# Patient Record
Sex: FEMALE | Race: WHITE | Employment: FULL TIME | URBAN - METROPOLITAN AREA
[De-identification: names, ages, dates, MRNs, and addresses within clinical notes are randomized per-mention and may not be internally consistent; named-entity substitution may affect disease eponyms.]

---

## 2018-04-20 ENCOUNTER — HOSPITAL ENCOUNTER (OUTPATIENT)
Dept: ULTRASOUND IMAGING | Facility: HOSPITAL | Age: 28
Discharge: HOME | End: 2018-04-20
Attending: INTERNAL MEDICINE
Payer: COMMERCIAL

## 2018-04-20 DIAGNOSIS — R10.2 SUPRAPUBIC PAIN: ICD-10-CM

## 2018-04-20 PROCEDURE — 76856 US EXAM PELVIC COMPLETE: CPT | Performed by: INTERNAL MEDICINE

## 2018-04-20 PROCEDURE — 76830 TRANSVAGINAL US NON-OB: CPT | Performed by: INTERNAL MEDICINE

## 2018-07-03 ENCOUNTER — OFFICE VISIT (OUTPATIENT)
Dept: OTOLARYNGOLOGY | Facility: CLINIC | Age: 28
End: 2018-07-03

## 2018-07-03 ENCOUNTER — OFFICE VISIT (OUTPATIENT)
Dept: AUDIOLOGY | Facility: CLINIC | Age: 28
End: 2018-07-03

## 2018-07-03 VITALS
DIASTOLIC BLOOD PRESSURE: 70 MMHG | TEMPERATURE: 98 F | HEIGHT: 68 IN | WEIGHT: 143 LBS | SYSTOLIC BLOOD PRESSURE: 105 MMHG | BODY MASS INDEX: 21.67 KG/M2

## 2018-07-03 DIAGNOSIS — H90.12 CONDUCTIVE HEARING LOSS OF LEFT EAR WITH UNRESTRICTED HEARING OF RIGHT EAR: Primary | ICD-10-CM

## 2018-07-03 DIAGNOSIS — H71.92 CHOLESTEATOMA OF LEFT EAR: ICD-10-CM

## 2018-07-03 DIAGNOSIS — H91.92 HEARING LOSS OF LEFT EAR, UNSPECIFIED HEARING LOSS TYPE: Primary | ICD-10-CM

## 2018-07-03 PROCEDURE — 99212 OFFICE O/P EST SF 10 MIN: CPT | Performed by: OTOLARYNGOLOGY

## 2018-07-03 PROCEDURE — 92557 COMPREHENSIVE HEARING TEST: CPT | Performed by: AUDIOLOGIST

## 2018-07-03 PROCEDURE — 99243 OFF/OP CNSLTJ NEW/EST LOW 30: CPT | Performed by: OTOLARYNGOLOGY

## 2018-07-03 PROCEDURE — 92567 TYMPANOMETRY: CPT | Performed by: AUDIOLOGIST

## 2018-07-03 PROCEDURE — 92504 EAR MICROSCOPY EXAMINATION: CPT | Performed by: OTOLARYNGOLOGY

## 2018-07-03 NOTE — PROGRESS NOTES
Jorje Zamarripa is a 28year old female. Patient presents with:  Hearing Loss: left ear for at least 2 years     HPI:   She has been experiencing recurrent episodes of infections in her left ear for many years. She has been about once or twice a year. Normal Inspection - Right: Normal, Left: Normal. Canal - Left: Normal. TM - Right: Normal, Left: Normal.  [place] attic retraction without perforation. Left ear shows attic perforation with squamous debris which was cleared using suction and microscopy.

## 2018-07-03 NOTE — PROGRESS NOTES
AUDIOGRAM     Danna Mccracken was referred for testing by Ezequiel Prater V for left sided hearing loss   10/31/1990  [id number]      Otoscopic inspection: right ear no cerumen; left ear no cerumen.        Tests/Procedures  Patient was tested via  standard

## 2018-07-05 ENCOUNTER — TELEPHONE (OUTPATIENT)
Dept: OTOLARYNGOLOGY | Facility: CLINIC | Age: 28
End: 2018-07-05

## 2018-07-05 NOTE — TELEPHONE ENCOUNTER
Called pt's insurance [phone], spoke with [facility], a prior authorization is not required for CT temporal bones, reference number [id number]. Unable to leave a message for the pt the mailbox is not set up yet.

## 2018-07-09 NOTE — TELEPHONE ENCOUNTER
Spoke with pt and informed of above information and it is the patient's responsibility to verify benefits and out of pocket expenses for this service, pt verbalized understanding.

## 2020-03-02 ENCOUNTER — TELEPHONE (OUTPATIENT)
Dept: SCHEDULING | Age: 30
End: 2020-03-02

## 2020-03-03 ENCOUNTER — TELEPHONE (OUTPATIENT)
Dept: SURGERY | Age: 30
End: 2020-03-03

## 2020-05-12 ENCOUNTER — TELEPHONE (OUTPATIENT)
Dept: SURGERY | Age: 30
End: 2020-05-12

## 2020-05-13 ENCOUNTER — TELEPHONE (OUTPATIENT)
Dept: SCHEDULING | Age: 30
End: 2020-05-13

## 2020-05-13 ENCOUNTER — TELEPHONE (OUTPATIENT)
Dept: OTOLARYNGOLOGY | Age: 30
End: 2020-05-13

## 2020-05-15 ENCOUNTER — APPOINTMENT (OUTPATIENT)
Dept: OTOLARYNGOLOGY | Age: 30
End: 2020-05-15

## 2025-01-09 ENCOUNTER — APPOINTMENT (OUTPATIENT)
Dept: URBAN - METROPOLITAN AREA CLINIC 244 | Age: 35
Setting detail: DERMATOLOGY
End: 2025-01-09

## 2025-01-09 DIAGNOSIS — Z12.83 ENCOUNTER FOR SCREENING FOR MALIGNANT NEOPLASM OF SKIN: ICD-10-CM

## 2025-01-09 DIAGNOSIS — D485 NEOPLASM OF UNCERTAIN BEHAVIOR OF SKIN: ICD-10-CM

## 2025-01-09 PROBLEM — D48.5 NEOPLASM OF UNCERTAIN BEHAVIOR OF SKIN: Status: ACTIVE | Noted: 2025-01-09

## 2025-01-09 PROCEDURE — OTHER ORDER ULTRASOUND: OTHER

## 2025-01-09 PROCEDURE — OTHER COUNSELING: OTHER

## 2025-01-09 PROCEDURE — OTHER DIAGNOSIS COMMENT: OTHER

## 2025-01-09 PROCEDURE — 99202 OFFICE O/P NEW SF 15 MIN: CPT

## 2025-01-09 ASSESSMENT — LOCATION ZONE DERM: LOCATION ZONE: TRUNK

## 2025-01-09 ASSESSMENT — LOCATION DETAILED DESCRIPTION DERM
LOCATION DETAILED: INFERIOR THORACIC SPINE
LOCATION DETAILED: SUPERIOR LUMBAR SPINE

## 2025-01-09 ASSESSMENT — LOCATION SIMPLE DESCRIPTION DERM
LOCATION SIMPLE: LOWER BACK
LOCATION SIMPLE: UPPER BACK

## 2025-01-09 NOTE — PROCEDURE: DIAGNOSIS COMMENT
Render Risk Assessment In Note?: no
Detail Level: Simple
Comment: Recommend having imaging.... ultra sound  ordered for pt but ultimately I defer to an ortho's rec as I feel this is not a dermatological problem amenable to dermatological intervention. I discussed I could not confirm the diagnosis today and that I feel an orthopedic doctor is the best next step for evaluation and I feel is necessary and important.\\nif it is changing/growing then prompt evaluation is particularly important. 
Comment: Although a limited exam was performed today (instead of a full exam per pt preference), I strongly encourage full-body skin exams. If limited exams are preferred, good self-examinations of all areas of the body are required (handout provided with information on how to perform self skin checks and what to look for as it relates to concerning lesions)

## 2025-01-09 NOTE — HPI: EVALUATION OF SKIN LESION(S)
How Severe Are Your Spot(S)?: mild
Have Your Spot(S) Been Treated In The Past?: has not been treated
Hpi Title: Evaluation of a Skin Lesion
Additional History: Pt tried to pop lesion

## 2025-01-09 NOTE — PROCEDURE: ORDER ULTRASOUND
Provider: Chacorta Szymanski MD
Priority: normal
Subcutaneous Lesion Ultrasound Reason: Sq nodule of unknown etiology. ? Lipoma?
Lesion Location: left lower back
Detail Level: Simple
Ultrasound Protocol: Ultrasound of Subcutaneous Mass

## (undated) NOTE — LETTER
Seth Perez [address]       07/03/18        Patient: Franck Elizabeth   YOB: 1990   Date of Visit: 7/3/2018       Dear  Dr. Guerline Raygoza,      Thank you for referring Franck Elizabeth to my practice.   Please f